# Patient Record
Sex: FEMALE | Race: BLACK OR AFRICAN AMERICAN | NOT HISPANIC OR LATINO | Employment: UNEMPLOYED | URBAN - METROPOLITAN AREA
[De-identification: names, ages, dates, MRNs, and addresses within clinical notes are randomized per-mention and may not be internally consistent; named-entity substitution may affect disease eponyms.]

---

## 2019-10-21 PROBLEM — Z30.42 DEPO-PROVERA CONTRACEPTIVE STATUS: Status: ACTIVE | Noted: 2017-08-28

## 2019-10-21 PROBLEM — F33.2 ENDOGENOUS DEPRESSION SINGLE EPISODE OR UNSPECIFIED: Status: ACTIVE | Noted: 2019-10-21

## 2020-05-21 ENCOUNTER — SOCIAL WORK (OUTPATIENT)
Dept: ADMINISTRATIVE | Facility: OTHER | Age: 20
End: 2020-05-21

## 2020-05-21 NOTE — PROGRESS NOTES
SW met with pt regarding initial OB assessment. Pt stated this is her 3rd pregnancy/2-miscarriage. Pt stated lives alone and able to perform ADL's independently. Pt stated support system is her stepmother/[place]. Pt stated has applied for medicaid. Pt stated does not have WIC. SW provide pt with information on other community resources and referred to the [facility]. No other needs identified at this time.    Tayla Galvan,MSW  Pager#[phone]

## 2020-06-10 PROBLEM — F33.2 ENDOGENOUS DEPRESSION SINGLE EPISODE OR UNSPECIFIED: Status: RESOLVED | Noted: 2019-10-21 | Resolved: 2020-06-10

## 2020-06-10 PROBLEM — Z3A.09 9 WEEKS GESTATION OF PREGNANCY: Status: ACTIVE | Noted: 2020-06-10

## 2020-06-10 PROBLEM — Z34.91 ENCOUNTER FOR SUPERVISION OF NORMAL PREGNANCY IN FIRST TRIMESTER: Status: ACTIVE | Noted: 2020-06-10

## 2020-06-10 PROBLEM — Z30.42 DEPO-PROVERA CONTRACEPTIVE STATUS: Status: RESOLVED | Noted: 2017-08-28 | Resolved: 2020-06-10

## 2020-06-10 PROBLEM — O99.810 ABNORMAL O'SULLIVAN GLUCOSE CHALLENGE TEST, ANTEPARTUM: Status: ACTIVE | Noted: 2020-06-10

## 2020-06-10 PROBLEM — B37.31 YEAST VAGINITIS: Status: ACTIVE | Noted: 2020-06-10

## 2020-07-08 PROBLEM — Z3A.13 13 WEEKS GESTATION OF PREGNANCY: Status: ACTIVE | Noted: 2020-06-10

## 2020-08-12 PROBLEM — R10.9 ABDOMINAL PAIN DURING PREGNANCY IN SECOND TRIMESTER: Status: ACTIVE | Noted: 2020-08-12

## 2020-08-12 PROBLEM — O26.892 ABDOMINAL PAIN DURING PREGNANCY IN SECOND TRIMESTER: Status: ACTIVE | Noted: 2020-08-12

## 2020-08-12 PROBLEM — Z3A.18 18 WEEKS GESTATION OF PREGNANCY: Status: ACTIVE | Noted: 2020-06-10

## 2020-08-13 PROBLEM — A59.01 TRICHOMONAS VAGINALIS (TV) INFECTION: Status: ACTIVE | Noted: 2020-08-13

## 2020-09-02 PROBLEM — O26.892 VAGINAL DISCHARGE DURING PREGNANCY IN SECOND TRIMESTER: Status: ACTIVE | Noted: 2020-09-02

## 2020-09-02 PROBLEM — Z34.92 SECOND TRIMESTER PREGNANCY: Status: ACTIVE | Noted: 2020-09-02

## 2020-09-02 PROBLEM — N89.8 VAGINAL DISCHARGE DURING PREGNANCY IN SECOND TRIMESTER: Status: ACTIVE | Noted: 2020-09-02

## 2020-11-18 PROBLEM — Z3A.32 32 WEEKS GESTATION OF PREGNANCY: Status: ACTIVE | Noted: 2020-11-18

## 2020-11-18 PROBLEM — Z34.92 SECOND TRIMESTER PREGNANCY: Status: RESOLVED | Noted: 2020-09-02 | Resolved: 2020-11-18

## 2020-11-18 PROBLEM — O26.899 ABDOMINAL PAIN AFFECTING PREGNANCY: Status: ACTIVE | Noted: 2020-08-12

## 2020-11-19 PROBLEM — N89.8 VAGINAL DISCHARGE DURING PREGNANCY IN SECOND TRIMESTER: Status: RESOLVED | Noted: 2020-09-02 | Resolved: 2020-11-19

## 2020-11-19 PROBLEM — A74.9 CHLAMYDIA INFECTION AFFECTING PREGNANCY IN THIRD TRIMESTER: Status: ACTIVE | Noted: 2020-11-19

## 2020-11-19 PROBLEM — O98.813 CHLAMYDIA INFECTION AFFECTING PREGNANCY IN THIRD TRIMESTER: Status: ACTIVE | Noted: 2020-11-19

## 2020-11-19 PROBLEM — O26.892 VAGINAL DISCHARGE DURING PREGNANCY IN SECOND TRIMESTER: Status: RESOLVED | Noted: 2020-09-02 | Resolved: 2020-11-19

## 2020-11-20 PROBLEM — O23.593 TRICHOMONAL VAGINITIS DURING PREGNANCY IN THIRD TRIMESTER: Status: ACTIVE | Noted: 2020-08-13

## 2020-12-15 PROBLEM — R03.0 SINGLE EPISODE OF ELEVATED BLOOD PRESSURE: Status: RESOLVED | Noted: 2020-12-15 | Resolved: 2020-12-15

## 2020-12-15 PROBLEM — B37.31 YEAST VAGINITIS: Status: ACTIVE | Noted: 2020-12-15

## 2020-12-15 PROBLEM — R03.0 SINGLE EPISODE OF ELEVATED BLOOD PRESSURE: Status: ACTIVE | Noted: 2020-12-15

## 2020-12-15 PROBLEM — O14.93 PRE-ECLAMPSIA IN THIRD TRIMESTER: Status: RESOLVED | Noted: 2020-12-15 | Resolved: 2020-12-15

## 2020-12-15 PROBLEM — Z3A.36 36 WEEKS GESTATION OF PREGNANCY: Status: ACTIVE | Noted: 2020-11-18

## 2020-12-15 PROBLEM — O14.93 PRE-ECLAMPSIA IN THIRD TRIMESTER: Status: ACTIVE | Noted: 2020-12-15

## 2020-12-16 PROBLEM — I10 HYPERTENSION: Status: ACTIVE | Noted: 2020-12-16

## 2020-12-17 PROBLEM — I10 HYPERTENSION: Status: ACTIVE | Noted: 2020-12-17

## 2020-12-17 PROBLEM — O13.3 PREGNANCY-INDUCED HYPERTENSION IN THIRD TRIMESTER: Status: ACTIVE | Noted: 2020-12-17

## 2020-12-17 PROBLEM — I10 HYPERTENSION: Status: RESOLVED | Noted: 2020-12-16 | Resolved: 2020-12-17

## 2021-03-22 PROBLEM — O13.3 PREGNANCY-INDUCED HYPERTENSION IN THIRD TRIMESTER: Status: RESOLVED | Noted: 2020-12-17 | Resolved: 2021-03-22

## 2021-05-12 ENCOUNTER — PATIENT MESSAGE (OUTPATIENT)
Dept: RESEARCH | Facility: HOSPITAL | Age: 21
End: 2021-05-12

## 2021-07-01 ENCOUNTER — SOCIAL WORK (OUTPATIENT)
Dept: ADMINISTRATIVE | Facility: OTHER | Age: 21
End: 2021-07-01

## 2021-08-03 ENCOUNTER — PATIENT MESSAGE (OUTPATIENT)
Dept: ADMINISTRATIVE | Facility: OTHER | Age: 21
End: 2021-08-03

## 2021-08-10 ENCOUNTER — PATIENT MESSAGE (OUTPATIENT)
Dept: ADMINISTRATIVE | Facility: OTHER | Age: 21
End: 2021-08-10

## 2021-09-20 ENCOUNTER — NURSE TRIAGE (OUTPATIENT)
Dept: ADMINISTRATIVE | Facility: CLINIC | Age: 21
End: 2021-09-20

## 2021-11-17 PROBLEM — Z3A.36 36 WEEKS GESTATION OF PREGNANCY: Status: RESOLVED | Noted: 2020-11-18 | Resolved: 2021-11-17

## 2021-11-17 PROBLEM — Z87.59 H/O PRE-ECLAMPSIA: Status: ACTIVE | Noted: 2020-12-17

## 2021-11-17 PROBLEM — Z34.92 ENCOUNTER FOR SUPERVISION OF NORMAL PREGNANCY IN SECOND TRIMESTER: Status: ACTIVE | Noted: 2021-11-17

## 2021-12-26 PROBLEM — O36.8130 DECREASED FETAL MOVEMENTS IN THIRD TRIMESTER: Status: ACTIVE | Noted: 2021-12-26

## 2021-12-29 PROBLEM — E66.9 OBESITY (BMI 30-39.9): Status: ACTIVE | Noted: 2021-12-29

## 2021-12-29 PROBLEM — O99.810 ABNORMAL O'SULLIVAN GLUCOSE CHALLENGE TEST, ANTEPARTUM: Status: RESOLVED | Noted: 2020-06-10 | Resolved: 2021-12-29

## 2021-12-29 PROBLEM — Z34.93 ENCOUNTER FOR SUPERVISION OF NORMAL PREGNANCY IN THIRD TRIMESTER: Status: ACTIVE | Noted: 2021-11-17

## 2022-01-31 PROBLEM — O09.93 SUPERVISION OF HIGH RISK PREGNANCY IN THIRD TRIMESTER: Status: ACTIVE | Noted: 2022-01-31

## 2022-01-31 PROBLEM — Z3A.37 37 WEEKS GESTATION OF PREGNANCY: Status: ACTIVE | Noted: 2022-01-31

## 2022-02-07 PROBLEM — Z3A.38 38 WEEKS GESTATION OF PREGNANCY: Status: ACTIVE | Noted: 2022-01-31

## 2022-02-11 PROBLEM — D50.0 BLOOD LOSS ANEMIA: Status: ACTIVE | Noted: 2022-02-11

## 2022-03-28 PROBLEM — O36.8130 DECREASED FETAL MOVEMENTS IN THIRD TRIMESTER: Status: RESOLVED | Noted: 2021-12-26 | Resolved: 2022-03-28

## 2023-01-16 PROBLEM — Z3A.38 38 WEEKS GESTATION OF PREGNANCY: Status: RESOLVED | Noted: 2022-01-31 | Resolved: 2023-01-16

## 2024-03-05 PROBLEM — A74.9 CHLAMYDIA INFECTION AFFECTING PREGNANCY IN THIRD TRIMESTER: Status: RESOLVED | Noted: 2020-11-19 | Resolved: 2024-03-05

## 2024-03-05 PROBLEM — Z34.93 ENCOUNTER FOR SUPERVISION OF NORMAL PREGNANCY IN THIRD TRIMESTER: Status: RESOLVED | Noted: 2021-11-17 | Resolved: 2024-03-05

## 2024-03-05 PROBLEM — O98.813 CHLAMYDIA INFECTION AFFECTING PREGNANCY IN THIRD TRIMESTER: Status: RESOLVED | Noted: 2020-11-19 | Resolved: 2024-03-05

## 2024-03-05 PROBLEM — R10.9 ABDOMINAL PAIN AFFECTING PREGNANCY: Status: RESOLVED | Noted: 2020-08-12 | Resolved: 2024-03-05

## 2024-03-05 PROBLEM — O23.593 TRICHOMONAL VAGINITIS DURING PREGNANCY IN THIRD TRIMESTER: Status: RESOLVED | Noted: 2020-08-13 | Resolved: 2024-03-05

## 2024-03-05 PROBLEM — O26.899 ABDOMINAL PAIN AFFECTING PREGNANCY: Status: RESOLVED | Noted: 2020-08-12 | Resolved: 2024-03-05

## 2024-03-05 PROBLEM — A59.01 TRICHOMONAL VAGINITIS DURING PREGNANCY IN THIRD TRIMESTER: Status: RESOLVED | Noted: 2020-08-13 | Resolved: 2024-03-05

## 2024-03-05 PROBLEM — O09.93 SUPERVISION OF HIGH RISK PREGNANCY IN THIRD TRIMESTER: Status: RESOLVED | Noted: 2022-01-31 | Resolved: 2024-03-05

## 2024-03-05 PROBLEM — D50.0 BLOOD LOSS ANEMIA: Status: RESOLVED | Noted: 2022-02-11 | Resolved: 2024-03-05

## 2024-03-05 LAB — PAP RECOMMENDATION EXT: NORMAL

## 2024-04-04 ENCOUNTER — PATIENT OUTREACH (OUTPATIENT)
Dept: ADMINISTRATIVE | Facility: HOSPITAL | Age: 24
End: 2024-04-04